# Patient Record
Sex: MALE | Race: ASIAN
[De-identification: names, ages, dates, MRNs, and addresses within clinical notes are randomized per-mention and may not be internally consistent; named-entity substitution may affect disease eponyms.]

---

## 2017-07-29 ENCOUNTER — HEALTH MAINTENANCE LETTER (OUTPATIENT)
Age: 22
End: 2017-07-29

## 2020-01-13 ENCOUNTER — COMMUNICATION - HEALTHEAST (OUTPATIENT)
Dept: TELEHEALTH | Facility: CLINIC | Age: 25
End: 2020-01-13

## 2020-01-13 ENCOUNTER — OFFICE VISIT - HEALTHEAST (OUTPATIENT)
Dept: FAMILY MEDICINE | Facility: CLINIC | Age: 25
End: 2020-01-13

## 2020-01-13 DIAGNOSIS — F32.0 MILD MAJOR DEPRESSION (H): ICD-10-CM

## 2020-01-13 DIAGNOSIS — Z13.0 SCREENING FOR DEFICIENCY ANEMIA: ICD-10-CM

## 2020-01-13 DIAGNOSIS — Z00.00 ROUTINE GENERAL MEDICAL EXAMINATION AT A HEALTH CARE FACILITY: ICD-10-CM

## 2020-01-13 DIAGNOSIS — Z13.1 SCREENING FOR DIABETES MELLITUS: ICD-10-CM

## 2020-01-13 DIAGNOSIS — Z13.220 SCREENING FOR CHOLESTEROL LEVEL: ICD-10-CM

## 2020-01-13 LAB
ALBUMIN SERPL-MCNC: 4.2 G/DL (ref 3.5–5)
ALP SERPL-CCNC: 60 U/L (ref 45–120)
ALT SERPL W P-5'-P-CCNC: 49 U/L (ref 0–45)
ANION GAP SERPL CALCULATED.3IONS-SCNC: 9 MMOL/L (ref 5–18)
AST SERPL W P-5'-P-CCNC: 27 U/L (ref 0–40)
BILIRUB SERPL-MCNC: 0.5 MG/DL (ref 0–1)
BUN SERPL-MCNC: 11 MG/DL (ref 8–22)
CALCIUM SERPL-MCNC: 10 MG/DL (ref 8.5–10.5)
CHLORIDE BLD-SCNC: 104 MMOL/L (ref 98–107)
CHOLEST SERPL-MCNC: 126 MG/DL
CO2 SERPL-SCNC: 26 MMOL/L (ref 22–31)
CREAT SERPL-MCNC: 1.01 MG/DL (ref 0.7–1.3)
ERYTHROCYTE [DISTWIDTH] IN BLOOD BY AUTOMATED COUNT: 12.7 % (ref 11–14.5)
FASTING STATUS PATIENT QL REPORTED: YES
GFR SERPL CREATININE-BSD FRML MDRD: >60 ML/MIN/1.73M2
GLUCOSE BLD-MCNC: 88 MG/DL (ref 70–125)
HCT VFR BLD AUTO: 49.3 % (ref 40–54)
HDLC SERPL-MCNC: 45 MG/DL
HGB BLD-MCNC: 15.9 G/DL (ref 14–18)
LDLC SERPL CALC-MCNC: 61 MG/DL
MCH RBC QN AUTO: 28 PG (ref 27–34)
MCHC RBC AUTO-ENTMCNC: 32.2 G/DL (ref 32–36)
MCV RBC AUTO: 87 FL (ref 80–100)
PLATELET # BLD AUTO: 248 THOU/UL (ref 140–440)
PMV BLD AUTO: 8.3 FL (ref 7–10)
POTASSIUM BLD-SCNC: 3.9 MMOL/L (ref 3.5–5)
PROT SERPL-MCNC: 7.8 G/DL (ref 6–8)
RBC # BLD AUTO: 5.66 MILL/UL (ref 4.4–6.2)
SODIUM SERPL-SCNC: 139 MMOL/L (ref 136–145)
TRIGL SERPL-MCNC: 102 MG/DL
TSH SERPL DL<=0.005 MIU/L-ACNC: 2.84 UIU/ML (ref 0.3–5)
WBC: 7.8 THOU/UL (ref 4–11)

## 2020-01-13 ASSESSMENT — MIFFLIN-ST. JEOR: SCORE: 1806.59

## 2020-01-22 ENCOUNTER — RECORDS - HEALTHEAST (OUTPATIENT)
Dept: ADMINISTRATIVE | Facility: OTHER | Age: 25
End: 2020-01-22

## 2021-06-04 VITALS
WEIGHT: 202.5 LBS | HEART RATE: 60 BPM | HEIGHT: 64 IN | BODY MASS INDEX: 34.57 KG/M2 | DIASTOLIC BLOOD PRESSURE: 82 MMHG | SYSTOLIC BLOOD PRESSURE: 110 MMHG

## 2021-06-05 NOTE — PROGRESS NOTES
MALE PREVENTIVE EXAM    Assessment & Plan   1. Routine general medical examination at a health care facility  Fasting labs, update immunization.  With history of elevated blood pressure without diagnosis of hypertension did recommend annual physical and he is in agreement.  Blood pressure well within normal range today.   - Tdap vaccine,  8yo or older,  IM  - Influenza, Seasonal Quad, PF =/> 6months    2. Mild major depression (H)  Mild depression symptoms though persistent for 2-3 months.  Situational factors playing a large role with his father's recent cancer diagnosis and move back to the area.  Discussed treatment options and he is interested in seeing a therapist.  Referral placed and will schedule.  If no improvement with therapy alone, return to discuss medication management.   - Ambulatory referral to Psychology  - Thyroid Cascade    3. BMI 35.0-35.9,adult  Exercising regularly, congratulated on recent changes.  Discussed dietary recommendations and limiting intake of fast food/restaurant food    4. Screening for cholesterol level  - Lipid Cascade    5. Screening for diabetes mellitus  - Comprehensive Metabolic Panel    6. Screening for deficiency anemia  - HM2(CBC w/o Differential)    Alcohol use, safety and moderation discussed.  Recommended adequate calcium intake/osteoporosis prevention.  Diet discuss, including moderation of portions sizes, avoiding eating out and fast food and increase in fruits and vegetables.  Discussed safe sex practices.      Sis Pereira CNP    Subjective:   Chief Complaint:  Establish Care and Annual Exam (fasting)    HPI:  Keaton Heaton is a 24 y.o. male who presents for routine physical exam.  New patient. Previously seen at [facility] [place].  PMH notable for elevated blood pressure without diagnosis of hypertension.  He works in [employer] in [place], now working remotely from [place].    Father diagnosed with cancer of what sounds like the nasal septum 3  months ago.  He has moved back to [place] to assist with his care and has been able to work remotely for his company in [place].  He states he has struggled with depression on and off in the past.  First when he went through a period of unemployment after graduating from college a year ago.  He did feel his symptoms improved once he found a job.  Then shortly after his dad's diagnosis he noted depression symptoms returning.  Feels down, has noted anhedonia.  No passive or active suicidal ideation.  Difficulty with motivation.  Sleeping and eating okay.  No prior treatment for depression.  He is interested in seeing a therapist.    Blood pressure has been high on occasion, family medical history of hypertension in both parents.  Stroke in his father this past fall due to untreated hypertension.  Exercising 3-4x/week.  15 cardio and then resistance training.  Vegetables, fruits.  Does eat out approximately 25% of meals.  When cooking at home diet is high in fruits and vegetables, often stir fries.  Weight as high as 215 5 months ago and has come down with exercise.    Preventive Health:  Reviewed and recommended screening and treatment recommendations:  PSA: No family history  Colonoscopy: No family history  Immunizations: Due for Tdap    Health Habits:    Exercise: See HPI  Balanced Diet: See HPI  Seat Belt Use: YES  Calcium intake/Osteoporosis prevention: No  Guns: NO  Sun Screen: YES  Dental Care: YES    PMH:   There is no problem list on file for this patient.      No past medical history on file.    Current Medications: Reviewed  No current outpatient medications on file prior to visit.     No current facility-administered medications on file prior to visit.        Allergies: Reviewed   has No Known Allergies.    Social History:  Social History     Occupational History     Not on file   Tobacco Use     Smoking status: Never Smoker     Smokeless tobacco: Never Used   Substance and Sexual Activity     Alcohol  "use: Not on file     Drug use: Not on file     Sexual activity: Not on file       Family History:   No family history on file.      Review of Systems:  Complete head to toe review of systems is otherwise negative except as above.    Objective:    /82 (Patient Site: Right Arm, Patient Position: Sitting, Cuff Size: Adult Large)   Pulse 60   Ht 5' 3.5\" (1.613 m)   Wt 202 lb 8 oz (91.9 kg)   BMI 35.31 kg/m      GENERAL: Alert, well-appearing male.   PSYCH: Pleasant mood, affect appropriate.  Good judgment and insight.  Intact recent and remote memory. Good eye contact.  SKIN: No atypical lesions  EYES: Conjunctiva pink, sclera white, no exudates. RU.  EOMs intact. Corneal light reflex bilaterally, red reflex present. Undilated fundoscopic exam normal  EARS: TMs pearly grey, no bulging, redness, retraction.   MOUTH: Pharynx moist, pink without exudate. No tonsillar enlargement  NECK: No lymphadenopathy. Thyroid borders smooth without enlargement, nodules.   CV: Regular rate and rhythm without murmurs, rubs or gallops.  RESP: Lung sounds clear, symmetric excursion.   ABDOMEN: BS+. Abdomen soft.  No organomegaly, masses or hernias  :  Normal scrotum.  Testes descended bilaterally without mass or hernia. Penis circumcised without lesions or discharge.  PV : No edema        "

## 2021-06-26 ENCOUNTER — HEALTH MAINTENANCE LETTER (OUTPATIENT)
Age: 26
End: 2021-06-26

## 2021-10-16 ENCOUNTER — HEALTH MAINTENANCE LETTER (OUTPATIENT)
Age: 26
End: 2021-10-16

## 2022-07-23 ENCOUNTER — HEALTH MAINTENANCE LETTER (OUTPATIENT)
Age: 27
End: 2022-07-23

## 2022-10-01 ENCOUNTER — HEALTH MAINTENANCE LETTER (OUTPATIENT)
Age: 27
End: 2022-10-01

## 2023-08-06 ENCOUNTER — HEALTH MAINTENANCE LETTER (OUTPATIENT)
Age: 28
End: 2023-08-06